# Patient Record
(demographics unavailable — no encounter records)

---

## 2024-10-31 NOTE — IMAGING
[AP] : anteroposterior [There are no fractures, subluxations or dislocations. No significant abnormalities are seen] : There are no fractures, subluxations or dislocations. No significant abnormalities are seen [Facet arthropathy] : Facet arthropathy [Straightening consistent with spasm] : Straightening consistent with spasm [Disc space narrowing] : Disc space narrowing [de-identified] : CSPINE Inspection: No rash or ecchymosis Palpation: spasm and TTP in traps, rhomboids, paracervicals ROM: Limited all planes Strength: 5/5 bilateral deltoid, biceps, triceps, wrist flexors, wrist extensors, , abductors Sensation: Sensation present to light touch bilateral C5-T1 distributions Reflexes: Negative Hedrick's bilaterally [FreeTextEntry1] : DDD most severe at L5-S1

## 2024-10-31 NOTE — HISTORY OF PRESENT ILLNESS
[Upper back] : upper back [Mid-back] : mid-back [Lower back] : lower back [Dull/Aching] : dull/aching [Leisure] : leisure [Meds] : meds [Standing] : standing [Lying in bed] : lying in bed [5] : 5 [3] : 3 [Intermittent] : intermittent [Sleep] : sleep [Nothing helps with pain getting better] : Nothing helps with pain getting better [de-identified] : 2/16/23-66 y/o F presents for right-sided lower back pain X 3-4 years. Denies specific injury. Denies pain/N/T in BLEs. Denies prior back surgeries or physical therapy. Denies b/b dysfunction.   PMH: HTN  10/31/24: Pt is here for Upper/ Lower back pain. Pt states that she has mild arthritis in her RT hip. Nabil specific injury. States that pain started a couple of weeks ago. Denies numbness/ tingling. No pain down the UEs.  [] : no [FreeTextEntry1] : RT Hip  [FreeTextEntry5] : VIOLET 65 year old F here for RT hip and Lower back, onset pain for about 3-4 years, pt last saw an orthopedic last year and was told she has orthopedic \par  pt take Motrin for the pain  [FreeTextEntry9] : Tylenol [de-identified] : 2022 [de-identified] : orthopedic

## 2024-10-31 NOTE — ASSESSMENT
[FreeTextEntry1] : PT, meds  Will discuss MRI  Muscle Relaxants- To help decrease muscle spasm and assist with pain relief. Advised of sedating effects and instructed not to drive, operate heavy machinery, or take with other sedating medications. 
98.9

## 2025-03-13 NOTE — DISCUSSION/SUMMARY
[de-identified] : The natural progression of osteoarthritis was explained to the patient.  We discussed the possible treatment options from conservative to operative.  These included NSAIDS, Glucosamine and Chondrotin sulfate, and Physical Therapy as well different types of injections.  We also discussed that at some point they may progress to needed a TKA.  Information and pamphlets were given.    We discussed their diagnosis and treatment options at length including surgical and non-surgical options. We will first attempt conservative treatment with activity modification, PT, icing, weight loss, and anti-inflammatory medications. We discussed the possible of injections (steroid and viscosupplementation) in the future. The patient was provided with a PT prescription to work on ROM, hip ER/abductors strengthening, quad/hamstring stretches and strengthening, and other exercises on the Knee Arthritis Protocol.    Dx / Natural History:  The patient was advised of the diagnosis.  The natural history of the pathology was explained in full to the patient in layman's terms.  Several different treatment options were discussed and explained in full to the patient including the risks and benefits of both surgical and non-surgical treatments.  All questions and concerns were answered.     Pain Guide Activities:  The patient was advised to let pain guide the gradual advancement of activities.    Physical Therapy:  The patient was provided with a prescription for Physical Therapy.    Icing:  The patient was advised to apply ice (wrapped in a towel or protective covering) to the area daily (20 minutes at a time, 2-4X/day).    Follow up in 4 weeks to re-evaluate.  Next visit Consider MR if not improving prior to injection *** 3.13 Pt presents today with symptoms of positional neurogenic claudication Recommend MR of L spine offered Medrol dose pack, pt not interested knee pain resolved FU with Dr. Whitehead after L spine mr for further eval and management

## 2025-03-13 NOTE — DATA REVIEWED
[FreeTextEntry1] : Bilateral  X-Ray Examination of the KNEE (4 views): there are no fractures, subluxations or dislocations. (Medial and Patellofemoral degenerative changes.)  2 v tib/fib neg for fx or dislocation

## 2025-03-13 NOTE — IMAGING
[de-identified] : LEFT KNEE  Inspection:  mild effusion  Palpation: medial joint line tenderness, anterior tenderness  Knee Range of Motion:  3-125   Strength: 5/5 Quadriceps strength, 5/5 Hamstring strength  Neurological: light touch is intact throughout  Ligament Stability and Special Tests:   McMurrays: neg  Lachman: neg  Pivot Shift: neg  Posterior Drawer: neg  Valgus: neg  Varus: neg  Patella Apprehension: neg  Patella Maltracking: neg  RIGHT KNEE  Inspection:  mild effusion  Palpation: medial joint line tenderness, anterior tenderness  Knee Range of Motion:  3-125   Strength: 5/5 Quadriceps strength, 5/5 Hamstring strength  Neurological: light touch is intact throughout  Ligament Stability and Special Tests:   McMurrays: neg  Lachman: neg  Pivot Shift: neg  Posterior Drawer: neg  Valgus: neg  Varus: neg  Patella Apprehension: neg  Patella Maltracking: neg  Back / Spine:  Inspection: no erythema and ecchymosis.  Palpation: bilateral lumbar paraspinal tenderness.   Range of motion:  Near full range of motion but with mild stiffness. Pain with lateral rotation. pain at extremes of flexion  Strength Testing: Weakness with ankle dorsiflexion and EHL strength  Otherwise 5/5 throughout both lower extremities with normal tone   Neurological testing: light touch is intact throughout both lower extremities  Straight Leg Raise: positive right  Babiniski test: neg

## 2025-03-13 NOTE — HISTORY OF PRESENT ILLNESS
[de-identified] : Date of Injury/Onset:  bilateral knee pain beginning 10 days ago.  Pt reports left knee started first. then throughout the week pt reports right knee started feeling stiff. Pain:    At Rest:  2/10   With Activity:  6/10   Mechanism of injury:  No significant injury.  Pt reports some swelling 10 days ago lasting 7 days This is [not] a Work Related Injury being treated under Worker's Compensation.  This is [not] an athletic injury occurring associated with an interscholastic or organized sports team.  Quality of symptoms:  sharp, dull aching Improves with:  ice packs Worse with: walkng   Prior treatment:   Prior Imaging:   Reports Available For Review Today:  Out of work/sport: [Yes], since [date of injury]  School/Sport/Position/Occupation:_  Personal goal:  Additional Information: [None]  Pt denies any interveral trauma or injury. Pain is localized along the medial joint of bilateral legs L > R. Worsened with prolonged weightbearing and deep knee flexion. Improved with rest and ice. Denies numbness tingling BLE.   03/13/2025 REINA  is here today to follow up on b/l knee. Patient completed PT. Patient reports pain returned lately. Denies new injury. Patient reports some numbness & achy pain. Pain radiates to b/l calf.

## 2025-04-10 NOTE — IMAGING
[Facet arthropathy] : Facet arthropathy [AP] : anteroposterior [There are no fractures, subluxations or dislocations. No significant abnormalities are seen] : There are no fractures, subluxations or dislocations. No significant abnormalities are seen [Straightening consistent with spasm] : Straightening consistent with spasm [Disc space narrowing] : Disc space narrowing [de-identified] : LSPINE Inspection: No rash or ecchymosis Palpation: No tenderness to palpation or spasm in bilateral thoracic and lumbar paraspinal musculature, no SI joint tenderness to palpation ROM: Full with no pain Strength: 5/5 bilateral hip flexors, knee extensors, ankle dorsiflexors, EHL, ankle plantarflexors Sensation: Sensation present to light touch bilateral L2-S1 distributions Provocative maneuvers: Negative bilateral straight leg raise [FreeTextEntry1] : DDD most severe at L5-S1

## 2025-04-10 NOTE — IMAGING
[Facet arthropathy] : Facet arthropathy [AP] : anteroposterior [There are no fractures, subluxations or dislocations. No significant abnormalities are seen] : There are no fractures, subluxations or dislocations. No significant abnormalities are seen [Straightening consistent with spasm] : Straightening consistent with spasm [Disc space narrowing] : Disc space narrowing [de-identified] : LSPINE Inspection: No rash or ecchymosis Palpation: No tenderness to palpation or spasm in bilateral thoracic and lumbar paraspinal musculature, no SI joint tenderness to palpation ROM: Full with no pain Strength: 5/5 bilateral hip flexors, knee extensors, ankle dorsiflexors, EHL, ankle plantarflexors Sensation: Sensation present to light touch bilateral L2-S1 distributions Provocative maneuvers: Negative bilateral straight leg raise [FreeTextEntry1] : DDD most severe at L5-S1

## 2025-04-10 NOTE — HISTORY OF PRESENT ILLNESS
[Upper back] : upper back [Mid-back] : mid-back [Lower back] : lower back [5] : 5 [3] : 3 [Dull/Aching] : dull/aching [Intermittent] : intermittent [Leisure] : leisure [Sleep] : sleep [Meds] : meds [Nothing helps with pain getting better] : Nothing helps with pain getting better [Standing] : standing [Lying in bed] : lying in bed [de-identified] : 3/19/25 Lumbar MRI  - report noted in chart.   Ind. review- NF narrowing L3/4, L5/S1 LR narrowing L>R L4/5 =========================================================================================== 2/16/23-64 y/o F presents for right-sided lower back pain X 3-4 years. Denies specific injury. Denies pain/N/T in BLEs. Denies prior back surgeries or physical therapy. Denies b/b dysfunction.   PMH: HTN  10/31/24: Pt is here for Upper/ Lower back pain. Pt states that she has mild arthritis in her RT hip. Nabil specific injury. States that pain started a couple of weeks ago. Denies numbness/ tingling. No pain down the UEs.  04/10/2025: patient is here to discuss mri res. pt notes pain being the same  [] : no [FreeTextEntry1] : RT Hip  [FreeTextEntry5] : VIOLET 65 year old F here for RT hip and Lower back, onset pain for about 3-4 years, pt last saw an orthopedic last year and was told she has orthopedic \par  pt take Motrin for the pain  [FreeTextEntry9] : Tylenol [de-identified] : 2022 [de-identified] : orthopedic

## 2025-04-10 NOTE — ASSESSMENT
[FreeTextEntry1] : NF narrowing L3/4, L5/S1 LR narrowing L>R L4/5 Discussed indication for surgery  PT, meds  Discussed pain c/s f/u 6 weeks   Gabapentin- Patient advised of sedating effects, instructed not to drive, operate machinery, or take with other sedating medications. Advised of need to taper on/off medication and risk of abruptly stopping gabapentin.

## 2025-04-10 NOTE — HISTORY OF PRESENT ILLNESS
[Upper back] : upper back [Mid-back] : mid-back [Lower back] : lower back [5] : 5 [3] : 3 [Dull/Aching] : dull/aching [Intermittent] : intermittent [Leisure] : leisure [Sleep] : sleep [Meds] : meds [Nothing helps with pain getting better] : Nothing helps with pain getting better [Standing] : standing [Lying in bed] : lying in bed [de-identified] : 3/19/25 Lumbar MRI  - report noted in chart.   Ind. review- NF narrowing L3/4, L5/S1 LR narrowing L>R L4/5 =========================================================================================== 2/16/23-66 y/o F presents for right-sided lower back pain X 3-4 years. Denies specific injury. Denies pain/N/T in BLEs. Denies prior back surgeries or physical therapy. Denies b/b dysfunction.   PMH: HTN  10/31/24: Pt is here for Upper/ Lower back pain. Pt states that she has mild arthritis in her RT hip. Nabil specific injury. States that pain started a couple of weeks ago. Denies numbness/ tingling. No pain down the UEs.  04/10/2025: patient is here to discuss mri res. pt notes pain being the same  [] : no [FreeTextEntry1] : RT Hip  [FreeTextEntry5] : VIOLET 65 year old F here for RT hip and Lower back, onset pain for about 3-4 years, pt last saw an orthopedic last year and was told she has orthopedic \par  pt take Motrin for the pain  [FreeTextEntry9] : Tylenol [de-identified] : 2022 [de-identified] : orthopedic

## 2025-07-17 NOTE — HISTORY OF PRESENT ILLNESS
[de-identified] : Date of Injury/Onset:  bilateral knee pain beginning 10 days ago.  Pt reports left knee started first. then throughout the week pt reports right knee started feeling stiff. Pain:    At Rest:  2/10   With Activity:  6/10   Mechanism of injury:  No significant injury.  Pt reports some swelling 10 days ago lasting 7 days This is [not] a Work Related Injury being treated under Worker's Compensation.  This is [not] an athletic injury occurring associated with an interscholastic or organized sports team.  Quality of symptoms:  sharp, dull aching Improves with:  ice packs Worse with: walkng   Prior treatment:   Prior Imaging:   Reports Available For Review Today:  Out of work/sport: [Yes], since [date of injury]  School/Sport/Position/Occupation:_  Personal goal:  Additional Information: [None]  Pt denies any interveral trauma or injury. Pain is localized along the medial joint of bilateral legs L > R. Worsened with prolonged weightbearing and deep knee flexion. Improved with rest and ice. Denies numbness tingling BLE.   03/13/2025 REINA  is here today to follow up on b/l knee. Patient did PT. Patient reports pain returned lately. Denies new injury. Patient reports some numbness & achy pain. Pain radiates to b/l calf.  07/17/2025 REINA is here today to follow up on b/l knee. Patient stopped doing PT in April, would like to resume PT.  Patient reports pain overall has been improvement.

## 2025-07-17 NOTE — DISCUSSION/SUMMARY
[Medication Risks Reviewed] : Medication risks reviewed [de-identified] : The natural progression of osteoarthritis was explained to the patient.  We discussed the possible treatment options from conservative to operative.  These included NSAIDS, Glucosamine and Chondrotin sulfate, and Physical Therapy as well different types of injections.  We also discussed that at some point they may progress to needed a TKA.  Information and pamphlets were given.    We discussed their diagnosis and treatment options at length including surgical and non-surgical options. We will first attempt conservative treatment with activity modification, PT, icing, weight loss, and anti-inflammatory medications. We discussed the possible of injections (steroid and viscosupplementation) in the future. The patient was provided with a PT prescription to work on ROM, hip ER/abductors strengthening, quad/hamstring stretches and strengthening, and other exercises on the Knee Arthritis Protocol.    Dx / Natural History:  The patient was advised of the diagnosis.  The natural history of the pathology was explained in full to the patient in layman's terms.  Several different treatment options were discussed and explained in full to the patient including the risks and benefits of both surgical and non-surgical treatments.  All questions and concerns were answered.     Pain Guide Activities:  The patient was advised to let pain guide the gradual advancement of activities.    Physical Therapy:  The patient was provided with a prescription for Physical Therapy.    Icing:  The patient was advised to apply ice (wrapped in a towel or protective covering) to the area daily (20 minutes at a time, 2-4X/day).    Follow up in 4 weeks to re-evaluate.  Next visit Consider MR if not improving prior to injection *** 3.13 Pt presents today with symptoms of positional neurogenic claudication Recommend MR of L spine offered Medrol dose pack, pt not interested knee pain resolved FU with Dr. Whitehead after L spine mr for further eval and management *** b/l knee oa r worse than left Pt interested in mobic and PT rtc prn  next visit: tka referral

## 2025-07-17 NOTE — DATA REVIEWED
[FreeTextEntry1] : Bilateral  X-Ray Examination of the KNEE (4 views): there are no fractures, subluxations or dislocations. (Medial and Patellofemoral degenerative changes.)  2 v tib/fib neg for fx or dislocation  Bilateral  X-Ray Examination of the KNEE (4 views): there are no fractures, subluxations or dislocations. (Medial and Patellofemoral degenerative changes.)

## 2025-07-17 NOTE — IMAGING
[de-identified] : LEFT KNEE  Inspection:  mild effusion  Palpation: medial joint line tenderness, anterior tenderness  Knee Range of Motion:  3-125   Strength: 5/5 Quadriceps strength, 5/5 Hamstring strength  Neurological: light touch is intact throughout  Ligament Stability and Special Tests:   McMurrays: neg  Lachman: neg  Pivot Shift: neg  Posterior Drawer: neg  Valgus: neg  Varus: neg  Patella Apprehension: neg  Patella Maltracking: neg  RIGHT KNEE  Inspection:  mild effusion  Palpation: medial joint line tenderness, anterior tenderness  Knee Range of Motion:  3-125   Strength: 5/5 Quadriceps strength, 5/5 Hamstring strength  Neurological: light touch is intact throughout  Ligament Stability and Special Tests:   McMurrays: neg  Lachman: neg  Pivot Shift: neg  Posterior Drawer: neg  Valgus: neg  Varus: neg  Patella Apprehension: neg  Patella Maltracking: neg  Back / Spine:  Inspection: no erythema and ecchymosis.  Palpation: bilateral lumbar paraspinal tenderness.   Range of motion:  Near full range of motion but with mild stiffness. Pain with lateral rotation. pain at extremes of flexion  Strength Testing: Weakness with ankle dorsiflexion and EHL strength  Otherwise 5/5 throughout both lower extremities with normal tone   Neurological testing: light touch is intact throughout both lower extremities  Straight Leg Raise: positive right  Babiniski test: neg